# Patient Record
Sex: MALE | Race: BLACK OR AFRICAN AMERICAN | Employment: OTHER | URBAN - METROPOLITAN AREA
[De-identification: names, ages, dates, MRNs, and addresses within clinical notes are randomized per-mention and may not be internally consistent; named-entity substitution may affect disease eponyms.]

---

## 2018-01-29 ENCOUNTER — OP HISTORICAL/CONVERTED ENCOUNTER (OUTPATIENT)
Dept: OTHER | Age: 68
End: 2018-01-29

## 2021-01-01 ENCOUNTER — HOSPITAL ENCOUNTER (EMERGENCY)
Age: 71
End: 2021-11-20
Attending: EMERGENCY MEDICINE
Payer: MEDICARE

## 2021-01-01 DIAGNOSIS — I46.9 CARDIAC ARREST (HCC): Primary | ICD-10-CM

## 2021-01-01 PROCEDURE — 99284 EMERGENCY DEPT VISIT MOD MDM: CPT

## 2021-11-20 NOTE — ED TRIAGE NOTES
Per EMS: called for upper GI bleed that had been intermittent throughout the day yesterday, dark emesis in the trash can noted; when EMS arrived arrived, patient was A&Ox4, with systolic pressures in the 60s; when transferring patient to the stair chair, patient collapsed and went into cardiac arrest; EMS reports they got a pulse back, and then the patient went into cardiac arrest again; patient has been in asystole for approx.  30 minutes PTA; 4 rounds of epi total given to patient via EMS; pt on LUCUS with active compressions upon arrival

## 2021-11-20 NOTE — PROGRESS NOTES
Spiritual Care Assessment/Progress Note  [facility]      NAME: Jose Angel George MRN: [id number]  AGE: 66 y.o. SEX: male  Episcopal Affiliation: No preference   Language: English     11/20/2021     Total Time (in minutes): 20     Spiritual Assessment begun in [facility] EMERGENCY DEPT through conversation with:         [x]Patient        [] Family    [] Friend(s)        Reason for Consult: Code Blue/99, Death, ER     Spiritual beliefs: (Please include comment if needed)     [] Identifies with a sandra tradition:         [] Supported by a sandra community:            [] Claims no spiritual orientation:           [] Seeking spiritual identity:                [] Adheres to an individual form of spirituality:           [x] Not able to assess:                           Identified resources for coping:      [] Prayer                               [] Music                  [] Guided Imagery     [] Family/friends                 [] Pet visits     [] Devotional reading                         [x] Unknown     [] Other:                                            Interventions offered during this visit: (See comments for more details)                Plan of Care:     [] Support spiritual and/or cultural needs    [] Support AMD and/or advance care planning process      [] Support grieving process   [] Coordinate Rites and/or Rituals    [] Coordination with community clergy   [] No spiritual needs identified at this time   [] Detailed Plan of Care below (See Comments)  [] Make referral to Music Therapy  [] Make referral to Pet Therapy     [] Make referral to Addiction services  [] Make referral to [facility]  [] Make referral to Spiritual Care Partner  [] No future visits requested        [x] Contact Spiritual Care for further referrals     Comments: The purpose of the visit was in response to the [address] on the patient. No pulse was retrieved on the patient, he did not survive.  The patient's family had been notified and were present at the bedside upon arrival of the . The  provided the ministry of presence and the comfort of spiritual care on the unit. [facility] Christopher Florez.    can be reached by calling the  at [facility]  [phone]

## 2021-11-20 NOTE — ED PROVIDER NOTES
EMERGENCY DEPARTMENT HISTORY AND PHYSICAL EXAM      Date: 11/20/2021  Patient Name: Ramon Rea History of Presenting Illness     No chief complaint on file. History Provided By: EMS    HPI: Ramon Rea, 66 y.o. male with a past medical history significant Uncertain presents to the ED with cc of cardiac arrest.  History limited due to patient being unresponsive, EMS states they were initially called out for a GI bleed when the patient went unresponsive and lost pulses. Per EMS, 4 rounds of epi and ACLS protocol, with now approximately 30 minutes of asystole. There are no other complaints, changes, or physical findings at this time. PCP: No primary care provider on file. No current facility-administered medications on file prior to encounter. No current outpatient medications on file prior to encounter. Past History     Past Medical History:  No past medical history on file. Past Surgical History:  No past surgical history on file. Family History:  No family history on file. Social History:  Social History     Tobacco Use    Smoking status: Not on file    Smokeless tobacco: Not on file   Substance Use Topics    Alcohol use: Not on file    Drug use: Not on file       Allergies:  Not on File      Review of Systems   Unable to obtain due to patient unresponsiveness  Review of Systems    Physical Exam    Physical Exam  Constitutional:       General: Unresponsive HENT:      Head: Normocephalic and atraumatic. Nose: Nose normal.      Eyes:      Extraocular Movements: Extraocular movements absent. Pupils: Pupils fixed. Cardiovascular:      Rate and Rhythm: No spontaneous pulse  Pulmonary:      Effort: No spontaneous respiratory effort  Abdominal:      General: Abdomen is flat. There is no distension. Musculoskeletal:         General: No movement  Skin:     General: Skin is warm and dry. Capillary Refill: Capillary refill takes more than 2 seconds. Neurological:      General: Intubated and unresponsive  Psychiatric:         Mood and Affect: Unable to test due to patient being unresponsive  Lab and Diagnostic Study Results     Labs -   No results found for this or any previous visit (from the past 12 hour(s)). Radiologic Studies -   @lastxrresult@  CT Results  (Last 48 hours)    None        CXR Results  (Last 48 hours)    None            Medical Decision Making   - I am the first provider for this patient. - I reviewed the vital signs, available nursing notes, past medical history, past surgical history, family history and social history. - Initial assessment performed. The patients presenting problems have been discussed, and they are in agreement with the care plan formulated and outlined with them. I have encouraged them to ask questions as they arise throughout their visit. Vital Signs-Reviewed the patient's vital signs. No data found. Procedures   Me    Disposition   Disposition:         Diagnosis     Clinical Impression:   1. Cardiac arrest [facility])        Attestations:    Anjel Pizarro MD    Please note that this dictation was completed with [employer], the computer voice recognition software. Quite often unanticipated grammatical, syntax, homophones, and other interpretive errors are inadvertently transcribed by the computer software. Please disregard these errors. Please excuse any errors that have escaped final proofreading. Thank you.